# Patient Record
Sex: FEMALE | Race: WHITE | Employment: UNEMPLOYED | URBAN - METROPOLITAN AREA
[De-identification: names, ages, dates, MRNs, and addresses within clinical notes are randomized per-mention and may not be internally consistent; named-entity substitution may affect disease eponyms.]

---

## 2017-07-13 ENCOUNTER — TELEPHONE (OUTPATIENT)
Dept: INTERNAL MEDICINE | Age: 32
End: 2017-07-13

## 2017-07-14 ENCOUNTER — TELEPHONE (OUTPATIENT)
Dept: INTERNAL MEDICINE | Age: 32
End: 2017-07-14

## 2017-07-27 ENCOUNTER — OFFICE VISIT (OUTPATIENT)
Dept: INTERNAL MEDICINE | Age: 32
End: 2017-07-27
Payer: COMMERCIAL

## 2017-07-27 VITALS
SYSTOLIC BLOOD PRESSURE: 98 MMHG | HEIGHT: 63 IN | WEIGHT: 293 LBS | DIASTOLIC BLOOD PRESSURE: 83 MMHG | HEART RATE: 75 BPM | BODY MASS INDEX: 51.91 KG/M2

## 2017-07-27 DIAGNOSIS — M54.42 CHRONIC BILATERAL LOW BACK PAIN WITH LEFT-SIDED SCIATICA: ICD-10-CM

## 2017-07-27 DIAGNOSIS — G89.29 CHRONIC BILATERAL LOW BACK PAIN WITH LEFT-SIDED SCIATICA: ICD-10-CM

## 2017-07-27 DIAGNOSIS — F41.9 ANXIETY: ICD-10-CM

## 2017-07-27 DIAGNOSIS — E66.01 MORBID OBESITY DUE TO EXCESS CALORIES (HCC): ICD-10-CM

## 2017-07-27 DIAGNOSIS — F32.1 MODERATE SINGLE CURRENT EPISODE OF MAJOR DEPRESSIVE DISORDER (HCC): Primary | ICD-10-CM

## 2017-07-27 PROCEDURE — 99214 OFFICE O/P EST MOD 30 MIN: CPT | Performed by: INTERNAL MEDICINE

## 2017-07-27 RX ORDER — DULOXETIN HYDROCHLORIDE 30 MG/1
30 CAPSULE, DELAYED RELEASE ORAL DAILY
Qty: 30 CAPSULE | Refills: 3 | Status: SHIPPED | OUTPATIENT
Start: 2017-07-27 | End: 2017-10-05 | Stop reason: SDUPTHER

## 2017-07-27 ASSESSMENT — PATIENT HEALTH QUESTIONNAIRE - PHQ9
6. FEELING BAD ABOUT YOURSELF - OR THAT YOU ARE A FAILURE OR HAVE LET YOURSELF OR YOUR FAMILY DOWN: 3
8. MOVING OR SPEAKING SO SLOWLY THAT OTHER PEOPLE COULD HAVE NOTICED. OR THE OPPOSITE, BEING SO FIGETY OR RESTLESS THAT YOU HAVE BEEN MOVING AROUND A LOT MORE THAN USUAL: 2
7. TROUBLE CONCENTRATING ON THINGS, SUCH AS READING THE NEWSPAPER OR WATCHING TELEVISION: 3
1. LITTLE INTEREST OR PLEASURE IN DOING THINGS: 2
3. TROUBLE FALLING OR STAYING ASLEEP: 3
4. FEELING TIRED OR HAVING LITTLE ENERGY: 3
5. POOR APPETITE OR OVEREATING: 3
SUM OF ALL RESPONSES TO PHQ QUESTIONS 1-9: 23
10. IF YOU CHECKED OFF ANY PROBLEMS, HOW DIFFICULT HAVE THESE PROBLEMS MADE IT FOR YOU TO DO YOUR WORK, TAKE CARE OF THINGS AT HOME, OR GET ALONG WITH OTHER PEOPLE: 2
2. FEELING DOWN, DEPRESSED OR HOPELESS: 3
SUM OF ALL RESPONSES TO PHQ9 QUESTIONS 1 & 2: 5
9. THOUGHTS THAT YOU WOULD BE BETTER OFF DEAD, OR OF HURTING YOURSELF: 1

## 2017-08-07 ENCOUNTER — TELEPHONE (OUTPATIENT)
Dept: INTERNAL MEDICINE | Age: 32
End: 2017-08-07

## 2017-08-16 ENCOUNTER — OFFICE VISIT (OUTPATIENT)
Dept: BEHAVIORAL/MENTAL HEALTH CLINIC | Age: 32
End: 2017-08-16
Payer: COMMERCIAL

## 2017-08-16 DIAGNOSIS — F32.A DEPRESSION, UNSPECIFIED DEPRESSION TYPE: Primary | ICD-10-CM

## 2017-08-16 DIAGNOSIS — F41.9 ANXIETY: ICD-10-CM

## 2017-08-16 PROCEDURE — 90791 PSYCH DIAGNOSTIC EVALUATION: CPT | Performed by: PSYCHOLOGIST

## 2017-08-22 ENCOUNTER — TELEPHONE (OUTPATIENT)
Dept: BEHAVIORAL/MENTAL HEALTH CLINIC | Age: 32
End: 2017-08-22

## 2017-08-22 ENCOUNTER — TELEPHONE (OUTPATIENT)
Dept: INTERNAL MEDICINE | Age: 32
End: 2017-08-22

## 2017-08-29 ENCOUNTER — TELEPHONE (OUTPATIENT)
Dept: BEHAVIORAL/MENTAL HEALTH CLINIC | Age: 32
End: 2017-08-29

## 2017-10-04 ENCOUNTER — TELEPHONE (OUTPATIENT)
Dept: BEHAVIORAL/MENTAL HEALTH CLINIC | Age: 32
End: 2017-10-04

## 2017-10-05 ENCOUNTER — OFFICE VISIT (OUTPATIENT)
Dept: INTERNAL MEDICINE | Age: 32
End: 2017-10-05
Payer: COMMERCIAL

## 2017-10-05 ENCOUNTER — HOSPITAL ENCOUNTER (OUTPATIENT)
Age: 32
Setting detail: SPECIMEN
Discharge: HOME | End: 2017-10-05
Payer: COMMERCIAL

## 2017-10-05 VITALS
SYSTOLIC BLOOD PRESSURE: 120 MMHG | WEIGHT: 293 LBS | BODY MASS INDEX: 51.91 KG/M2 | DIASTOLIC BLOOD PRESSURE: 81 MMHG | HEART RATE: 87 BPM | HEIGHT: 63 IN

## 2017-10-05 DIAGNOSIS — K76.0 FATTY LIVER: ICD-10-CM

## 2017-10-05 DIAGNOSIS — M54.50 CHRONIC BILATERAL LOW BACK PAIN WITHOUT SCIATICA: ICD-10-CM

## 2017-10-05 DIAGNOSIS — F32.1 MODERATE SINGLE CURRENT EPISODE OF MAJOR DEPRESSIVE DISORDER (HCC): Primary | ICD-10-CM

## 2017-10-05 DIAGNOSIS — G89.29 CHRONIC BILATERAL LOW BACK PAIN WITHOUT SCIATICA: ICD-10-CM

## 2017-10-05 DIAGNOSIS — E66.01 MORBID OBESITY WITH BMI OF 50.0-59.9, ADULT (HCC): ICD-10-CM

## 2017-10-05 LAB
ALBUMIN SERPL-MCNC: 4 G/DL (ref 3.5–5.2)
ALBUMIN/GLOBULIN RATIO: 1.3 (ref 1–2.5)
ALP BLD-CCNC: 85 U/L (ref 35–104)
ALT SERPL-CCNC: 46 U/L (ref 5–33)
ANION GAP SERPL CALCULATED.3IONS-SCNC: 13 MMOL/L (ref 9–17)
AST SERPL-CCNC: 45 U/L
BILIRUB SERPL-MCNC: 0.31 MG/DL (ref 0.3–1.2)
BUN BLDV-MCNC: 8 MG/DL (ref 6–20)
BUN/CREAT BLD: ABNORMAL (ref 9–20)
CALCIUM SERPL-MCNC: 8.6 MG/DL (ref 8.6–10.4)
CHLORIDE BLD-SCNC: 104 MMOL/L (ref 98–107)
CO2: 23 MMOL/L (ref 20–31)
CREAT SERPL-MCNC: 0.65 MG/DL (ref 0.5–0.9)
GFR AFRICAN AMERICAN: >60 ML/MIN
GFR NON-AFRICAN AMERICAN: >60 ML/MIN
GFR SERPL CREATININE-BSD FRML MDRD: ABNORMAL ML/MIN/{1.73_M2}
GFR SERPL CREATININE-BSD FRML MDRD: ABNORMAL ML/MIN/{1.73_M2}
GLUCOSE BLD-MCNC: 95 MG/DL (ref 70–99)
HCT VFR BLD CALC: 42.8 % (ref 36–46)
HEMOGLOBIN: 13.9 G/DL (ref 12–16)
MCH RBC QN AUTO: 27.6 PG (ref 26–34)
MCHC RBC AUTO-ENTMCNC: 32.5 G/DL (ref 31–37)
MCV RBC AUTO: 85 FL (ref 80–100)
PDW BLD-RTO: 14.2 % (ref 12.5–15.4)
PLATELET # BLD: 257 K/UL (ref 140–450)
PMV BLD AUTO: 10.9 FL (ref 6–12)
POTASSIUM SERPL-SCNC: 3.8 MMOL/L (ref 3.7–5.3)
RBC # BLD: 5.04 M/UL (ref 4–5.2)
SODIUM BLD-SCNC: 140 MMOL/L (ref 135–144)
TOTAL PROTEIN: 7.2 G/DL (ref 6.4–8.3)
WBC # BLD: 13.2 K/UL (ref 3.5–11)

## 2017-10-05 PROCEDURE — 36415 COLL VENOUS BLD VENIPUNCTURE: CPT

## 2017-10-05 PROCEDURE — 80053 COMPREHEN METABOLIC PANEL: CPT

## 2017-10-05 PROCEDURE — 85027 COMPLETE CBC AUTOMATED: CPT

## 2017-10-05 PROCEDURE — 99213 OFFICE O/P EST LOW 20 MIN: CPT | Performed by: INTERNAL MEDICINE

## 2017-10-05 RX ORDER — TIZANIDINE 4 MG/1
TABLET ORAL
COMMUNITY
Start: 2016-09-20 | End: 2017-10-05 | Stop reason: SDUPTHER

## 2017-10-05 RX ORDER — TIZANIDINE 4 MG/1
4 TABLET ORAL DAILY PRN
Qty: 30 TABLET | Refills: 3 | Status: SHIPPED | OUTPATIENT
Start: 2017-10-05

## 2017-10-05 RX ORDER — DULOXETIN HYDROCHLORIDE 30 MG/1
30 CAPSULE, DELAYED RELEASE ORAL DAILY
Qty: 30 CAPSULE | Refills: 5 | Status: SHIPPED | OUTPATIENT
Start: 2017-10-05 | End: 2018-02-21

## 2017-10-05 NOTE — PROGRESS NOTES
Patient here for follow-up on her depression. She is doing better on Cymbalta. She is following up with Dr. Claudia Rasmussen though missed her appointment yesterday. She is morbidly obese. Blood pressure is high. But repeat was better. She is currently refusing bariatric clinic referral.  She has history of sleep apnea symptoms were also currently not interested in follow-up. Plan  Follow-up in 3 months. Continue Cymbalta for now. Attending Physician Statement  I have discussed the care of Nuria Hernandes, including pertinent history and exam findings,  with the resident. I have reviewed the key elements of all parts of the encounter with the resident. I agree with the assessment, plan and orders as documented by the resident.   (GE Modifier)

## 2017-10-05 NOTE — PROGRESS NOTES
[facility]  [facility] [address] [facility]  [address] [facility] [address] [facility]  Dept: [phone]  Dept Fax: [phone]    Office Progress/Follow Up Note  Date of patient's visit: 10/5/2017  Patient's Name:  Tyshawn Peter YOB: 1985            Patient Care Team:  Jg Hill MD as PCP - General (Internal Medicine)  ================================================================    REASON FOR VISIT/CHIEF COMPLAINT:  Obesity (6 week follow up); Health Maintenance (Flu due, patient declined vaccine); and Anxiety    HISTORY OF PRESENTING ILLNESS:  History was obtained from: patient, electronic medical record. Tyshawn Peter is a 34 y.o. is here for a follow up for depression. She has no complaints. She states her depression is better. She is taking cymbalta. She missed sometimes. She has seen Dr. Delia Gaines. She missed her appt yesterday with Dr. Delia Gaines. She states she is more social and likes to hang out. Her children are doing great. Her boy friend moved in with her and is helping her out. Her first BP reading was 140/90 mmhg. Her repeat BP was 120/80 mmhg.        Patient Active Problem List   Diagnosis    Environmental and seasonal allergies    Allergic contact dermatitis    Swelling of lower extremity    Chronic bilateral low back pain without sciatica    LILLIAN (obstructive sleep apnea)    Morbid obesity due to excess calories (HCC)    mildly Elevated liver enzymes likely sec to fatty liver       Health Maintenance Due   Topic Date Due    Flu vaccine (1) 09/01/2017       Allergies   Allergen Reactions    Penicillins     Clindamycin/Lincomycin Nausea And Vomiting    Lincomycin Nausea Only         Current Outpatient Prescriptions   Medication Sig Dispense Refill    Cetirizine HCl 10 MG CAPS Take by mouth      tiZANidine (ZANAFLEX) 4 MG tablet Take 1 tablet by mouth daily as needed (stiffness) 30 tablet 3    DULoxetine (CYMBALTA) 30 MG extended release capsule Take 1 capsule by mouth daily 30 capsule 5    VENTOLIN  (90 BASE) MCG/ACT inhaler Inhale 2 puffs into the lungs every 6 hours as needed  0     No current facility-administered medications for this visit. Social History   Substance Use Topics    Smoking status: Former Smoker    Smokeless tobacco: Never Used    Alcohol use No       History reviewed. No pertinent family history.      REVIEW OF SYSTEMS:  CONSTITUTIONAL: Denies: fever, chills  PSYCH: Denies: anxiety, depression  ALLERGIES: Denies: urticaria  EYES: Denies: blurry vision, decreased vision, photophobia  ENT: Denies: sore throat, nasal congestion  CARDIOVASCULAR: Denies: chest pain, dyspnea on exertion  RESPIRATORY: Denies: cough, hemoptysis, shortness of breath  GI: Denies: Denies: abdominal pain, flank pain  : Denies: Denies: dysuria, frequency/urgency  NEURO: Denies: dizzy/vertigo, headache  MUSCULOSKELETAL: Denies: back pain, joint pain  SKIN: Denies: rash, itching  ROS    PHYSICAL EXAM:  Vitals:    10/05/17 1322 10/05/17 1404   BP: (!) 146/82 120/81   Site: Left Arm Left Arm   Position: Sitting Sitting   Cuff Size: Large Adult Large Adult   Pulse: 96 87   Weight: (!) 305 lb (138.3 kg)    Height: 5' 3\" (1.6 m)      BP Readings from Last 3 Encounters:   10/05/17 120/81   07/27/17 98/83   11/13/16 (!) 140/94      General appearance - alert, well appearing, and in no distress  Mental status - alert, oriented to person, place, and time  Chest - clear to auscultation, no wheezes, rales or rhonchi, symmetric air entry  Heart - normal rate, regular rhythm, normal S1, S2, no murmurs, rubs, clicks or gallops  Abdomen - soft, nontender, nondistended, no masses or organomegaly  Back exam - full range of motion, no tenderness, palpable spasm or pain on motion  Neurological - alert, oriented, normal speech, no focal findings or movement disorder noted  Musculoskeletal - no joint tenderness, deformity or swelling  Extremities - peripheral

## 2017-10-05 NOTE — MR AVS SNAPSHOT
After Visit Summary             [facility] Call   10/5/2017 1:25 PM   Office Visit    Description:  Female : 1985   Provider:  Elizabeth Abreu MD   Department:  [facility] and Future Appointments         Below is a list of your follow-up and future appointments. This may not be a complete list as you may have made appointments directly with providers that we are not aware of or your providers may have made some for you. Please call your providers to confirm appointments. It is important to keep your appointments. Please bring your current insurance card, photo ID, co-pay, and all medication bottles to your appointment. If self-pay, payment is expected at the time of service. Your To-Do List     Future Appointments Provider Department Dept Phone    10/19/2017 10:00 AM Dewayne Vargas Client at [address] [place] [address]    Please arrive 15 minutes prior to appointment time, bring insurance card and photo ID.     2018 9:45 AM MD LIANG Rae/ [facility] [address] [phone]    Please arrive 15 minutes prior to appointment time, bring insurance card and photo ID. Future Orders Complete By Expires    CBC [HUR778 Custom]  10/5/2017 10/5/2018    Comprehensive Metabolic Panel [ZCY13 Custom]  10/5/2017 10/5/2018    Follow-Up    Return in about 3 months (around 2018), or if symptoms worsen or fail to improve, for depression.          Information from Your Visit        Department     Name Address Phone Fax    [facility] [address] [phone]      You Were Seen for:         Comments    Moderate single current episode of major depressive disorder [facility])   [[id number]]         Vital Signs     Blood Pressure Pulse Height Weight Last Menstrual Period Body Mass Index    120/81 (Site: Left Arm, Position: Sitting, Cuff Size: Large Adult) 80 5' 3\" (1.6 m) 305 lb (138.3 kg) 10/01/2017 (Exact Date) 54.03 kg/m2    Smoking Status                   Former Smoker           Additional Information about your Body Mass Index (BMI)           Your BMI as listed above is considered obese (30 or more). BMI is an estimate of body fat, calculated from your height and weight. The higher your BMI, the greater your risk of heart disease, high blood pressure, type 2 diabetes, stroke, gallstones, arthritis, sleep apnea, and certain cancers. BMI is not perfect. It may overestimate body fat in athletes and people who are more muscular. Even a small weight loss (between 5 and 10 percent of your current weight) by decreasing your calorie intake and becoming more physically active will help lower your risk of developing or worsening diseases associated with obesity. Learn more at: [employer]          Instructions    Follow-up appointment scheduled for 1/11/18, AVS given to patient. Calendar of appointment reminder given to patient. Appt schedule with Dr. Sean Moss on 10/19/17 at 10 am. Please arrive 15 minutes early. Please call office if unable to make appt. Labs given to patient, they will have them done before their next visit. LJ            Today's Medication Changes          These changes are accurate as of: 10/5/17  2:13 PM.  If you have any questions, ask your nurse or doctor. CHANGE how you take these medications           tiZANidine 4 MG tablet   Commonly known as:  ZANAFLEX   Instructions:   Take 1 tablet by mouth daily as needed (stiffness)   Quantity:  30 tablet   Refills:  3   What changed:    - how much to take  - when to take this  - reasons to take this   Changed by:  Niecy Kearney MD            Where to Get Your Medications      These medications were sent to [address] [facility], [address] [employer] [address], [address] [place] [address] Phone:  [phone]     DULoxetine 30 MG extended release capsule    tiZANidine 4 MG tablet               Your Current Medications Are              Cetirizine HCl 10 MG CAPS Take by mouth    tiZANidine (ZANAFLEX) 4 MG tablet Take 1 tablet by mouth daily as needed (stiffness)    DULoxetine (CYMBALTA) 30 MG extended release capsule Take 1 capsule by mouth daily    VENTOLIN  (90 BASE) MCG/ACT inhaler Inhale 2 puffs into the lungs every 6 hours as needed      Allergies              Penicillins     Clindamycin/lincomycin Nausea And Vomiting    Lincomycin Nausea Only         Additional Information        Basic Information     Date Of Birth Sex Race Ethnicity Preferred Language    1985 Female White Non-/Non  English      Problem List as of 10/5/2017  Date Reviewed: 10/5/2017                mildly Elevated liver enzymes likely sec to fatty liver    Environmental and seasonal allergies    Allergic contact dermatitis    Swelling of lower extremity    Chronic bilateral low back pain without sciatica    LILLIAN (obstructive sleep apnea)    Morbid obesity due to excess calories ([address]      Immunizations as of 10/5/2017     Name Date    Tdap (Boostrix, Adacel) 9/20/2016      Preventive Care        Date Due    Yearly Flu Vaccine (1) 9/1/2017    HIV screening is recommended for all people regardless of risk factors  aged 15-65 years at least once (lifetime) who have never been HIV tested. 7/28/2018 (Originally 6/30/1999)    Pap Smear 7/27/2020 (Originally 6/30/2005)    Tetanus Combination Vaccine (2 - Td) 9/20/2026            Agent [facility] Signup           Our records indicate that you have an active [employer]BOX account. You can view your After Visit Summary by going to [email or web address] and logging in with your VBOX username and password.       If you don't have a VBOX username and password but a parent or guardian has access to your record, the parent or guardian should login

## 2017-11-01 ENCOUNTER — TELEPHONE (OUTPATIENT)
Dept: INTERNAL MEDICINE | Age: 32
End: 2017-11-01

## 2017-11-01 DIAGNOSIS — M54.42 CHRONIC RIGHT-SIDED LOW BACK PAIN WITH BILATERAL SCIATICA: Primary | ICD-10-CM

## 2017-11-01 DIAGNOSIS — G89.29 CHRONIC RIGHT-SIDED LOW BACK PAIN WITH BILATERAL SCIATICA: Primary | ICD-10-CM

## 2017-11-01 DIAGNOSIS — M54.41 CHRONIC RIGHT-SIDED LOW BACK PAIN WITH BILATERAL SCIATICA: Primary | ICD-10-CM

## 2017-11-01 NOTE — TELEPHONE ENCOUNTER
Pt calling stating she had a referral for PT at [facility] that was in place at her visit in July, she didn't call until recently and now needs a new referral placed because she waited too long to schedule the appt. Referral pending please sign if appropriate. Pt will need to call and scheduled appt please call pt and make aware that the referral is placed.

## 2018-02-21 ENCOUNTER — OFFICE VISIT (OUTPATIENT)
Dept: INTERNAL MEDICINE | Age: 33
End: 2018-02-21
Payer: COMMERCIAL

## 2018-02-21 ENCOUNTER — TELEPHONE (OUTPATIENT)
Dept: VASCULAR SURGERY | Age: 33
End: 2018-02-21

## 2018-02-21 VITALS
HEART RATE: 79 BPM | SYSTOLIC BLOOD PRESSURE: 130 MMHG | WEIGHT: 293 LBS | HEIGHT: 63 IN | BODY MASS INDEX: 51.91 KG/M2 | DIASTOLIC BLOOD PRESSURE: 80 MMHG

## 2018-02-21 DIAGNOSIS — G40.A09 SEIZURE, ABSENCE (HCC): Primary | ICD-10-CM

## 2018-02-21 DIAGNOSIS — I83.90 VARICOSE VEIN OF LEG: ICD-10-CM

## 2018-02-21 PROCEDURE — G8427 DOCREV CUR MEDS BY ELIG CLIN: HCPCS | Performed by: INTERNAL MEDICINE

## 2018-02-21 PROCEDURE — 99213 OFFICE O/P EST LOW 20 MIN: CPT | Performed by: INTERNAL MEDICINE

## 2018-02-21 PROCEDURE — G8417 CALC BMI ABV UP PARAM F/U: HCPCS | Performed by: INTERNAL MEDICINE

## 2018-02-21 PROCEDURE — 1036F TOBACCO NON-USER: CPT | Performed by: INTERNAL MEDICINE

## 2018-02-21 PROCEDURE — G8484 FLU IMMUNIZE NO ADMIN: HCPCS | Performed by: INTERNAL MEDICINE

## 2018-02-21 ASSESSMENT — ENCOUNTER SYMPTOMS
COUGH: 0
DOUBLE VISION: 0
NAUSEA: 0
HEARTBURN: 0
BLURRED VISION: 0
ABDOMINAL PAIN: 0
HEMOPTYSIS: 0

## 2018-02-21 NOTE — PROGRESS NOTES
Visit Information    Have you changed or started any medications since your last visit including any over-the-counter medicines, vitamins, or herbal medicines? no   Have you stopped taking any of your medications? Is so, why? -  no  Are you having any side effects from any of your medications? - no    Have you seen any other physician or provider since your last visit?  no   Have you had any other diagnostic tests since your last visit? yes - labs,    Have you been seen in the emergency room and/or had an admission in a hospital since we last saw you?  no     Have you had your routine dental cleaning in the past 6 months?  no     Do you have an active MyChart account? If no, what is the barrier?   Yes    Patient Care Team:  Vick Musa MD as PCP - General (Internal Medicine)    Medical History Review  Past Medical, Family, and Social History reviewed and does contribute to the patient presenting condition    Health Maintenance   Topic Date Due    Flu vaccine (1) 09/01/2017    HIV screen  07/28/2018 (Originally 6/30/1999)    Cervical cancer screen  07/27/2020 (Originally 6/30/2005)    DTaP/Tdap/Td vaccine (2 - Td) 09/20/2026
is also complaining of swollen vein on the right anterior thigh which she noted a month ago after she started exercising. She denies any associated pain. Problem list, medications and blood work reviewed      Patient Active Problem List   Diagnosis    Environmental and seasonal allergies    Allergic contact dermatitis    Chronic bilateral low back pain without sciatica    LILLIAN (obstructive sleep apnea)    Morbid obesity due to excess calories (HCC)    mildly Elevated liver enzymes likely sec to fatty liver       Health Maintenance Due   Topic Date Due    Flu vaccine (1) 09/01/2017       Allergies   Allergen Reactions    Penicillins     Clindamycin/Lincomycin Nausea And Vomiting    Lincomycin Nausea Only         Current Outpatient Prescriptions   Medication Sig Dispense Refill    tiZANidine (ZANAFLEX) 4 MG tablet Take 1 tablet by mouth daily as needed (stiffness) 30 tablet 3    VENTOLIN  (90 BASE) MCG/ACT inhaler Inhale 2 puffs into the lungs every 6 hours as needed  0    Cetirizine HCl 10 MG CAPS Take by mouth       No current facility-administered medications for this visit. Social History   Substance Use Topics    Smoking status: Former Smoker    Smokeless tobacco: Never Used    Alcohol use No       History reviewed. No pertinent family history. REVIEW OF SYSTEMS:  Review of Systems   Constitutional: Negative for chills and fever. HENT: Negative for congestion, ear discharge and nosebleeds. Eyes: Negative for blurred vision and double vision. Respiratory: Negative for cough and hemoptysis. Cardiovascular: Negative for chest pain and palpitations. Gastrointestinal: Negative for abdominal pain, heartburn and nausea. Genitourinary: Negative for dysuria and urgency. Musculoskeletal: Negative for myalgias and neck pain. Neurological: Positive for loss of consciousness. Negative for dizziness and headaches. Endo/Heme/Allergies: Does not bruise/bleed easily.

## 2018-03-06 ENCOUNTER — INITIAL CONSULT (OUTPATIENT)
Dept: VASCULAR SURGERY | Age: 33
End: 2018-03-06
Payer: COMMERCIAL

## 2018-03-06 VITALS
WEIGHT: 293 LBS | HEIGHT: 63 IN | BODY MASS INDEX: 51.91 KG/M2 | RESPIRATION RATE: 18 BRPM | OXYGEN SATURATION: 97 % | HEART RATE: 98 BPM | DIASTOLIC BLOOD PRESSURE: 80 MMHG | SYSTOLIC BLOOD PRESSURE: 122 MMHG

## 2018-03-06 DIAGNOSIS — I83.813 VARICOSE VEINS OF BILATERAL LOWER EXTREMITIES WITH PAIN: Primary | ICD-10-CM

## 2018-03-06 PROCEDURE — 99203 OFFICE O/P NEW LOW 30 MIN: CPT | Performed by: SURGERY

## 2018-03-06 NOTE — PROGRESS NOTES
Division of Vascular Surgery          New Consult - Venous Evaluation      Name: Sandie Ring  MRN: [id number]       Physician Requesting Consult:  Dr. Liudmila Mcclain    Reason for Consult:   Pain right thigh due to varicose vein    Chief Complaint:      I've pain in my right thigh      History of Present Illness:      Sandie Ring is a 34 y.o.  female who presents with a history of morbid obesity with a BMI of 56. Patient recognizes that she has weight problems and is been recently active and exercising. She started that she is noticed on her right thigh varicose vein is starting to enlarge rather quickly and causes her pain and discomfort. The pain is sharp nonradiating and improves when she stops her activity rubs it and then can resume her activity is normal.  It only occurs occasionally it is not reproducible. Denies any TIAs, strokes, amaurosis fugax, ulcerations of the legs or concerns of lymphedema or unilateral leg swelling. History of leg trauma?: No  History of Medical Compression therapy?: No  History of DVT?: No  History of Hypercoagulable disease?: No  History of leg/ankle ulcers?: No      Past Medical History:     Past Medical History:   Diagnosis Date    Back pain, chronic         Past Surgical History:     Past Surgical History:   Procedure Laterality Date    TONSILLECTOMY          Medications Prior to Admission:       Prior to Admission medications    Medication Sig Start Date End Date Taking? Authorizing Provider   Cetirizine HCl 10 MG CAPS Take by mouth   Yes Historical Provider, MD   tiZANidine (ZANAFLEX) 4 MG tablet Take 1 tablet by mouth daily as needed (stiffness) 10/5/17  Yes Tyson Hathaway MD   VENTOLIN  (90 BASE) MCG/ACT inhaler Inhale 2 puffs into the lungs every 6 hours as needed 6/13/16  Yes Historical Provider, MD        Allergies:       Penicillins; Clindamycin/lincomycin; and Lincomycin    Social History:     Tobacco:    reports that she has quit smoking.  She has never used smokeless tobacco.  Alcohol:      reports that she does not drink alcohol. Drug Use:  reports that she does not use drugs. Family History:     History reviewed. No pertinent family history.     Review of Systems:     Positive and Negative as described in HPI      Constitutional:  negative for  fevers, chills, sweats, fatigue, and weight loss  HEENT:  negative for vision or hearing changes,   Respiratory:  negative for shortness of breath, cough, or congestion  Cardiovascular:  negative for  chest pain, palpitations  Gastrointestinal:  negative for nausea, vomiting, diarrhea, constipation, abdominal pain  Genitourinary:  negative for frequency, dysuria  Integument:  negative for rash, skin lesions  Chest/Breast:  No painful inspiration or expiration, no rib sternal pain  Musculoskeletal:  negative for muscle aches or joint pain  Neurological:  negative for headaches, dizziness, lightheadedness, numbness, pain and tingling extremities, See hpi for leg sx  Lymphatics: no lymphadenopathy or painful masses  Behavior/Psych:  negative for depression and anxiety      Physical Exam:     Vitals:  /80 (Site: Left Arm, Position: Sitting, Cuff Size: Large Adult)   Pulse 98   Resp 18   Ht 5' 2.99\" (1.6 m)   Wt (!) 317 lb 7.4 oz (144 kg)   SpO2 97%   BMI 56.25 kg/m²       General appearance - alert, well appearing and in no acute distress, morbidly obese  Mental status - oriented to person, place and time with normal affect  Head - normocephalic and atraumatic  Eyes - pupils equal and reactive, extraocular eye movements intact, conjunctiva clear  Ears - hearing appears to be intact  Nose - no drainage noted  Mouth - mucous membranes moist  Neck - supple, no carotid bruits, thyroid not palpable, no JVD  Chest - clear to auscultation, normal effort  Heart - normal rate, regular rhythm, no murmurs  Abdomen - soft, non-tender, non-distended, bowel sounds present all four quadrants, no masses  Neurological -

## 2018-04-11 ENCOUNTER — OFFICE VISIT (OUTPATIENT)
Dept: NEUROLOGY | Age: 33
End: 2018-04-11
Payer: COMMERCIAL

## 2018-04-11 VITALS
HEIGHT: 63 IN | HEART RATE: 74 BPM | WEIGHT: 293 LBS | SYSTOLIC BLOOD PRESSURE: 140 MMHG | DIASTOLIC BLOOD PRESSURE: 96 MMHG | BODY MASS INDEX: 51.91 KG/M2

## 2018-04-11 DIAGNOSIS — R56.9 SEIZURE-LIKE ACTIVITY (HCC): Primary | ICD-10-CM

## 2018-04-11 PROCEDURE — 99205 OFFICE O/P NEW HI 60 MIN: CPT | Performed by: PSYCHIATRY & NEUROLOGY

## 2018-04-23 ENCOUNTER — HOSPITAL ENCOUNTER (OUTPATIENT)
Dept: MRI IMAGING | Age: 33
Discharge: HOME | End: 2018-04-25
Payer: COMMERCIAL

## 2018-04-23 DIAGNOSIS — R56.9 SEIZURE-LIKE ACTIVITY (HCC): ICD-10-CM

## 2018-04-23 PROCEDURE — A9576 INJ PROHANCE MULTIPACK: HCPCS | Performed by: PSYCHIATRY & NEUROLOGY

## 2018-04-23 PROCEDURE — 6360000004 HC RX CONTRAST MEDICATION: Performed by: PSYCHIATRY & NEUROLOGY

## 2018-04-23 PROCEDURE — 70553 MRI BRAIN STEM W/O & W/DYE: CPT

## 2018-04-23 RX ADMIN — GADOTERIDOL 20 ML: 279.3 INJECTION, SOLUTION INTRAVENOUS at 09:37

## 2018-05-07 ENCOUNTER — PROCEDURE VISIT (OUTPATIENT)
Dept: PEDIATRIC NEUROLOGY | Age: 33
End: 2018-05-07
Payer: COMMERCIAL

## 2018-05-07 ENCOUNTER — PROCEDURE VISIT (OUTPATIENT)
Dept: NEUROLOGY | Age: 33
End: 2018-05-07
Payer: COMMERCIAL

## 2018-05-07 DIAGNOSIS — R56.9 SEIZURE-LIKE ACTIVITY (HCC): ICD-10-CM

## 2018-05-07 DIAGNOSIS — E66.01 MORBID OBESITY DUE TO EXCESS CALORIES (HCC): Primary | ICD-10-CM

## 2018-05-07 PROCEDURE — 95816 EEG AWAKE AND DROWSY: CPT | Performed by: PSYCHIATRY & NEUROLOGY

## 2018-05-07 PROCEDURE — 95813 EEG EXTND MNTR 61-119 MIN: CPT | Performed by: PSYCHIATRY & NEUROLOGY

## 2020-11-17 ENCOUNTER — NURSE TRIAGE (OUTPATIENT)
Dept: OTHER | Facility: CLINIC | Age: 35
End: 2020-11-17

## 2020-11-17 ENCOUNTER — TELEPHONE (OUTPATIENT)
Dept: INTERNAL MEDICINE | Age: 35
End: 2020-11-17

## 2020-11-17 NOTE — TELEPHONE ENCOUNTER
hours     Care advice provided, patient verbalizes understanding; denies any other questions or concerns; instructed to call back for any new or worsening symptoms. Writer provided warm transfer to [facility] for appointment scheduling. Attention Provider: Thank you for allowing me to participate in the care of your patient. The patient was connected to triage in response to information provided to the E[facility]. Please do not respond through this encounter as the response is not directed to a shared pool.

## 2020-11-20 ENCOUNTER — TELEPHONE (OUTPATIENT)
Dept: INTERNAL MEDICINE | Age: 35
End: 2020-11-20